# Patient Record
Sex: FEMALE | Race: WHITE | NOT HISPANIC OR LATINO | URBAN - NONMETROPOLITAN AREA
[De-identification: names, ages, dates, MRNs, and addresses within clinical notes are randomized per-mention and may not be internally consistent; named-entity substitution may affect disease eponyms.]

---

## 2018-02-05 ENCOUNTER — OFFICE VISIT (OUTPATIENT)
Dept: CARDIOLOGY | Facility: CLINIC | Age: 63
End: 2018-02-05

## 2018-02-05 VITALS
SYSTOLIC BLOOD PRESSURE: 112 MMHG | BODY MASS INDEX: 22.18 KG/M2 | WEIGHT: 138 LBS | HEIGHT: 66 IN | HEART RATE: 81 BPM | DIASTOLIC BLOOD PRESSURE: 76 MMHG

## 2018-02-05 DIAGNOSIS — M54.50 CHRONIC MIDLINE LOW BACK PAIN WITHOUT SCIATICA: ICD-10-CM

## 2018-02-05 DIAGNOSIS — R07.2 PRECORDIAL PAIN: Primary | ICD-10-CM

## 2018-02-05 DIAGNOSIS — G89.29 CHRONIC MIDLINE LOW BACK PAIN WITHOUT SCIATICA: ICD-10-CM

## 2018-02-05 DIAGNOSIS — R06.09 DYSPNEA ON EXERTION: ICD-10-CM

## 2018-02-05 DIAGNOSIS — R07.2 PRECORDIAL PAIN: ICD-10-CM

## 2018-02-05 DIAGNOSIS — R53.83 OTHER FATIGUE: ICD-10-CM

## 2018-02-05 PROCEDURE — 93000 ELECTROCARDIOGRAM COMPLETE: CPT | Performed by: INTERNAL MEDICINE

## 2018-02-05 PROCEDURE — 99204 OFFICE O/P NEW MOD 45 MIN: CPT | Performed by: INTERNAL MEDICINE

## 2018-02-05 RX ORDER — BUTALBITAL, ACETAMINOPHEN AND CAFFEINE 50; 325; 40 MG/1; MG/1; MG/1
TABLET ORAL
COMMUNITY
Start: 2018-01-04

## 2018-02-05 RX ORDER — LEVOTHYROXINE SODIUM 0.05 MG/1
50 TABLET ORAL DAILY
COMMUNITY

## 2018-02-05 RX ORDER — BUPROPION HYDROCHLORIDE 150 MG/1
TABLET, EXTENDED RELEASE ORAL
COMMUNITY
Start: 2018-02-03

## 2018-02-05 RX ORDER — ERGOCALCIFEROL 1.25 MG/1
CAPSULE ORAL
COMMUNITY
Start: 2018-01-03

## 2018-02-05 RX ORDER — TRAMADOL HYDROCHLORIDE 50 MG/1
TABLET ORAL
COMMUNITY
Start: 2018-01-04

## 2018-02-05 RX ORDER — FLUTICASONE PROPIONATE 50 MCG
2 SPRAY, SUSPENSION (ML) NASAL DAILY
COMMUNITY

## 2018-02-05 RX ORDER — MIRTAZAPINE 30 MG/1
30 TABLET, FILM COATED ORAL
COMMUNITY
Start: 2018-01-03

## 2018-02-05 RX ORDER — GABAPENTIN 600 MG/1
TABLET ORAL
COMMUNITY
Start: 2018-01-04

## 2018-02-05 RX ORDER — CLONAZEPAM 1 MG/1
TABLET ORAL
COMMUNITY
Start: 2018-01-04

## 2018-02-05 NOTE — PROGRESS NOTES
Jessica Westfall DO Monie Weaver  1955 02/05/2018    Patient Active Problem List   Diagnosis   • Dyspnea on exertion   • Other fatigue   • Low back pain       Dear Dr. Westfall:    Subjective     Chief complaint: Chest pains.    History of Present Illness: Ms. Weaver is a pleasant 62-year-old  female with no history of known heart disease or coronary artery disease, presents with complains of having some intermittent episodes of chest pains which are localized to the left side of her chest underneath her left breast which  are sharp in nature.  These seem to occur with no relation to exertion and mostly when she is lying down at night.  She does not have any symptoms related to exertion except that she is short of breath with the mild exertion at times.  She denies any history suggestive of PND, orthopnea or pedal edema.  She denies any substernal chest pain or discomfort on any radiation of pain into the left arm neck or back.  She has very few risk factors for coronary artery disease being nonhypertensive, nondiabetic.  She has history of smoking for about 3 years smoking about 3 cigarettes a day.  She has chronic back pains with previous history of back surgery.    Cardiac risk factors:Age and postmenopausal status.    Allergies   Allergen Reactions   • Other      Ragweed   :      Current Outpatient Prescriptions:   •  fluticasone (FLONASE) 50 MCG/ACT nasal spray, 2 sprays into each nostril Daily., Disp: , Rfl:   •  levothyroxine (SYNTHROID, LEVOTHROID) 50 MCG tablet, Take 50 mcg by mouth Daily., Disp: , Rfl:   •  buPROPion SR (WELLBUTRIN SR) 150 MG 12 hr tablet, , Disp: , Rfl:   •  butalbital-acetaminophen-caffeine (FIORICET, ESGIC) -40 MG per tablet, , Disp: , Rfl:   •  clonazePAM (KlonoPIN) 1 MG tablet, , Disp: , Rfl:   •  Estradiol (ESTRADERM TD), PLACE 5-6 DROPS ON INNER WRIST AT BEDTIME, Disp: , Rfl: 0  •  gabapentin (NEURONTIN) 600 MG tablet, , Disp: , Rfl:   •  mirtazapine (REMERON) 15 MG tablet,  ", Disp: , Rfl:   •  Progesterone Micronized (PROGESTERONE PO), TAKE 1 CAPSULE BY MOUTH EVERY DAY AT BEDTIME, Disp: , Rfl: 2  •  traMADol (ULTRAM) 50 MG tablet, , Disp: , Rfl:   •  vitamin D (ERGOCALCIFEROL) 54331 units capsule capsule, , Disp: , Rfl:     No past medical history on file.  No past surgical history on file.  Family History   Problem Relation Age of Onset   • Heart disease Father      Social History   Substance Use Topics   • Smoking status: Current Every Day Smoker     Types: Cigarettes   • Smokeless tobacco: Not on file      Comment: 3 cig. per day, pt states.   • Alcohol use No       Review of Systems   Constitution: Positive for malaise/fatigue.   Eyes: Positive for blurred vision.   Cardiovascular: Positive for chest pain and palpitations.   Respiratory: Negative.    Hematologic/Lymphatic: Bruises/bleeds easily.   Skin: Positive for dry skin.   Musculoskeletal: Positive for back pain, joint pain, muscle weakness and myalgias.   Gastrointestinal: Negative.    Genitourinary: Negative.    Neurological: Positive for headaches.   Psychiatric/Behavioral: Positive for depression. The patient has insomnia and is nervous/anxious.    Allergic/Immunologic: Negative.        Objective   Blood pressure 112/76, pulse 81, height 167.6 cm (66\"), weight 62.6 kg (138 lb).  Body mass index is 22.27 kg/(m^2).        Physical Exam   Constitutional: She is oriented to person, place, and time. She appears well-developed and well-nourished.   HENT:   Head: Normocephalic.   Eyes: Conjunctivae and EOM are normal.   Neck: Normal range of motion. Neck supple. No JVD present. No tracheal deviation present. No thyromegaly present.   Cardiovascular: Normal rate, regular rhythm, S1 normal and S2 normal.  Exam reveals no gallop, no S3, no S4 and no friction rub.    No murmur heard.  Pulmonary/Chest: Breath sounds normal. No respiratory distress. She has no wheezes. She has no rales.   Abdominal: Soft. Bowel sounds are normal. She " exhibits no mass. There is no tenderness.   Musculoskeletal: She exhibits no edema.   Neurological: She is alert and oriented to person, place, and time. No cranial nerve deficit.   Skin: Skin is warm and dry.   Psychiatric: She has a normal mood and affect.            ECG 12 Lead  Date/Time: 2/5/2018 4:45 PM  Performed by: VICTOR HUGO GONGORA  Authorized by: VICTOR HUGO GONGORA   Rhythm: sinus rhythm  ST Segments: ST segments normal  T Waves: T waves normal              Assessment/Plan   Diagnoses and all orders for this visit:    1. Precordial pain sounds somewhat atypical for angina pectoris.  2. Dyspnea on exertion which could be angina equivalent.  3. Chronic fatigue.  4. Chronic midline low back pain without sciatica.         Recommendations:     Evaluate  further with her next scan sestamibi study and echo Doppler study.    Return in about 4 weeks (around 3/5/2018).    As always, I appreciate very much the opportunity to participate in the cardiovascular care of your patients.      With Best Regards,    Victor Hugo Gongora MD, FACC

## 2018-04-04 ENCOUNTER — TELEPHONE (OUTPATIENT)
Dept: CARDIOLOGY | Facility: CLINIC | Age: 63
End: 2018-04-04

## 2018-04-04 NOTE — TELEPHONE ENCOUNTER
Patient is scheduled to have a stress and echo and she says she has a really high deductible and high percentage she will have to pay and wants to know if it will be medically ok for her to wait until July when her medicare kicks in to have the test. Patient says she is feeling ok right now. If she needs to have the test at this time she is willing to. If she can wait from a medical stand point until July she would like to wait. Can someone call her back asap? If patient does not answer at this number she ask that you leave a message.     Thanks!

## 2018-04-06 NOTE — TELEPHONE ENCOUNTER
If she is not having any more significant chest pains, she could wait until July otherwise we will have to do it sooner.

## 2018-04-13 ENCOUNTER — APPOINTMENT (OUTPATIENT)
Dept: CARDIOLOGY | Facility: HOSPITAL | Age: 63
End: 2018-04-13
Attending: INTERNAL MEDICINE

## 2018-04-13 ENCOUNTER — APPOINTMENT (OUTPATIENT)
Dept: NUCLEAR MEDICINE | Facility: HOSPITAL | Age: 63
End: 2018-04-13
Attending: INTERNAL MEDICINE

## 2018-04-16 NOTE — TELEPHONE ENCOUNTER
Called pt and advised her. She expressed understanding.   She stated that that her wire in her bar was what was causing her chest pain.

## 2018-09-28 ENCOUNTER — TELEPHONE (OUTPATIENT)
Dept: CARDIOLOGY | Facility: CLINIC | Age: 63
End: 2018-09-28

## 2018-10-01 NOTE — TELEPHONE ENCOUNTER
This was ordered over 6 months ago. I feel like she needs to come in before we can order it again.

## 2019-02-12 ENCOUNTER — OFFICE VISIT (OUTPATIENT)
Dept: CARDIOLOGY | Facility: CLINIC | Age: 64
End: 2019-02-12

## 2019-02-12 VITALS
HEART RATE: 110 BPM | WEIGHT: 129 LBS | BODY MASS INDEX: 20.82 KG/M2 | DIASTOLIC BLOOD PRESSURE: 76 MMHG | SYSTOLIC BLOOD PRESSURE: 107 MMHG

## 2019-02-12 DIAGNOSIS — R07.2 PRECORDIAL PAIN: Primary | ICD-10-CM

## 2019-02-12 DIAGNOSIS — R00.0 SINUS TACHYCARDIA: ICD-10-CM

## 2019-02-12 PROBLEM — R00.2 PALPITATIONS: Status: ACTIVE | Noted: 2018-02-06

## 2019-02-12 PROCEDURE — 93000 ELECTROCARDIOGRAM COMPLETE: CPT | Performed by: PHYSICIAN ASSISTANT

## 2019-02-12 PROCEDURE — 99214 OFFICE O/P EST MOD 30 MIN: CPT | Performed by: PHYSICIAN ASSISTANT

## 2019-02-12 RX ORDER — MECLIZINE HYDROCHLORIDE 25 MG/1
TABLET ORAL
COMMUNITY
Start: 2019-02-06

## 2019-02-12 RX ORDER — PROPRANOLOL HYDROCHLORIDE 10 MG/1
10 TABLET ORAL 2 TIMES DAILY
COMMUNITY

## 2019-02-12 RX ORDER — TIZANIDINE 4 MG/1
0.5 TABLET ORAL EVERY 8 HOURS
COMMUNITY
Start: 2018-12-07

## 2019-02-12 RX ORDER — TOPIRAMATE 50 MG/1
1 TABLET, FILM COATED ORAL EVERY 12 HOURS
COMMUNITY
Start: 2018-12-07

## 2019-02-12 RX ORDER — SUMATRIPTAN 100 MG/1
1 TABLET, FILM COATED ORAL
COMMUNITY
Start: 2018-12-07

## 2019-02-12 RX ORDER — VENLAFAXINE HYDROCHLORIDE 150 MG/1
CAPSULE, EXTENDED RELEASE ORAL
COMMUNITY
Start: 2019-02-05

## 2019-02-12 RX ORDER — SODIUM PHOSPHATE,MONO-DIBASIC 19G-7G/118
1 ENEMA (ML) RECTAL EVERY 8 HOURS
COMMUNITY
Start: 2018-12-07

## 2019-02-12 RX ORDER — MELATONIN 10 MG
0.5 CAPSULE ORAL
COMMUNITY
Start: 2018-12-07

## 2019-02-12 NOTE — PROGRESS NOTES
Jossy Chew APRN  Monie Weaver  1955 02/12/2019    Patient Active Problem List   Diagnosis   • Dyspnea on exertion   • Other fatigue   • Low back pain   • Precordial pain   • Palpitations       Dear Jossy Chew APRN:    Subjective     History of Present Illness:    Chief Complaint   Patient presents with   • Chest Pain     pt l/s 9/2018   • Med Management     list   • Palpitations     fam hx afib       Monie Weaver is a pleasant 63 y.o. female with a past medical history significant for history of precordial pain, comes in for routine cardiology follow up.     Patient admits to some chest pain that occurred once a year ago but denies any chest pain the last 12 months. She does admit to some shortness of breath walking up her drive way which does have an incline. She denies any orthopnea, pedal edema, PND. She also denies any palpitations, dizziness, or syncope.     She denies history of tobacco abuse, diabetes, or history of past heart attack.     Allergies   Allergen Reactions   • Cefdinir Diarrhea   • Cefuroxime Diarrhea   • Other      Ragweed   :      Current Outpatient Medications:   •  butalbital-acetaminophen-caffeine (FIORICET, ESGIC) -40 MG per tablet, , Disp: , Rfl:   •  clonazePAM (KlonoPIN) 1 MG tablet, , Disp: , Rfl:   •  Estradiol (ESTRADERM TD), PLACE 5-6 DROPS ON INNER WRIST AT BEDTIME, Disp: , Rfl: 0  •  levothyroxine (SYNTHROID, LEVOTHROID) 50 MCG tablet, Take 50 mcg by mouth Daily., Disp: , Rfl:   •  mirtazapine (REMERON SOL-TAB) 30 MG disintegrating tablet, 30 mg., Disp: , Rfl:   •  propranolol (INDERAL) 10 MG tablet, Take 10 mg by mouth 2 (Two) Times a Day., Disp: , Rfl:   •  SUMAtriptan (IMITREX) 100 MG tablet, Take one tablet at onset of headache. May repeat dose one time in 2 hours if headache not relieved., Disp: , Rfl:   •  tiZANidine (ZANAFLEX) 4 MG tablet, Take 0.5 tablets by mouth Every 8 (Eight) Hours., Disp: , Rfl:   •  topiramate (TOPAMAX) 50 MG tablet, Take 1 tablet  by mouth Every 12 (Twelve) Hours., Disp: , Rfl:   •  venlafaxine XR (EFFEXOR-XR) 150 MG 24 hr capsule, , Disp: , Rfl:   •  vitamin D (ERGOCALCIFEROL) 84052 units capsule capsule, , Disp: , Rfl:   •  buPROPion SR (WELLBUTRIN SR) 150 MG 12 hr tablet, , Disp: , Rfl:   •  fluticasone (FLONASE) 50 MCG/ACT nasal spray, 2 sprays into each nostril Daily., Disp: , Rfl:   •  gabapentin (NEURONTIN) 600 MG tablet, , Disp: , Rfl:   •  glucosamine-chondroitin 500-400 MG capsule capsule, Take 1 tablet by mouth Every 8 (Eight) Hours., Disp: , Rfl:   •  meclizine (ANTIVERT) 25 MG tablet, , Disp: , Rfl:   •  Melatonin 10 MG capsule, Take 0.5 tablets by mouth., Disp: , Rfl:   •  Progesterone Micronized (PROGESTERONE PO), TAKE 1 CAPSULE BY MOUTH EVERY DAY AT BEDTIME, Disp: , Rfl: 2  •  traMADol (ULTRAM) 50 MG tablet, , Disp: , Rfl:       The following portions of the patient's history were reviewed and updated as appropriate: allergies, current medications, past family history, past medical history, past social history, past surgical history and problem list.    Social History     Tobacco Use   • Smoking status: Current Every Day Smoker     Types: Cigarettes   • Tobacco comment: 3 cig. per day, pt states.   Substance Use Topics   • Alcohol use: No   • Drug use: No       Review of Systems   Constitution: Negative for weakness and malaise/fatigue.   Cardiovascular: Positive for chest pain, dyspnea on exertion and irregular heartbeat.   Respiratory: Positive for shortness of breath. Negative for cough.    Hematologic/Lymphatic: Negative for bleeding problem. Does not bruise/bleed easily.   Gastrointestinal: Negative for nausea and vomiting.       Objective   Vitals:    02/12/19 1429   BP: 107/76   BP Location: Left arm   Patient Position: Sitting   Cuff Size: Adult   Pulse: 110   Weight: 58.5 kg (129 lb)     Body mass index is 20.82 kg/m².    Physical Exam   Constitutional: She is oriented to person, place, and time. She appears  well-developed and well-nourished. No distress.   HENT:   Head: Normocephalic and atraumatic.   Cardiovascular: Normal rate, regular rhythm, normal heart sounds and intact distal pulses.   Pulmonary/Chest: Effort normal and breath sounds normal. No respiratory distress.   Musculoskeletal: She exhibits no edema.   Neurological: She is alert and oriented to person, place, and time.   Skin: She is not diaphoretic.     Lab Results   Component Value Date     08/31/2015    K 4.5 08/31/2015     08/31/2015    CO2 27.9 08/31/2015    BUN 20 08/31/2015    CREATININE 0.69 08/31/2015    GLUCOSE 105 (H) 08/31/2015    CALCIUM 9.6 08/31/2015    AST 28 08/31/2015    ALT 27 08/31/2015    ALKPHOS 118 (H) 08/31/2015    LABIL2 1.9 08/31/2015     No results found for: CKTOTAL  Lab Results   Component Value Date    WBC 4.7 08/31/2015    HGB 14.3 08/31/2015    HCT 44.3 08/31/2015     08/31/2015     No results found for: INR  No results found for: MG  Lab Results   Component Value Date    TSH 1.748 08/31/2015    CHLPL 209 (H) 08/31/2015    TRIG 62 08/31/2015    HDL 87 08/31/2015     (H) 08/31/2015      No results found for: BNP    During this visit the following were done:  Labs Reviewed [x]    Labs Ordered []    Radiology Reports Reviewed [x]    Radiology Ordered []    PCP Records Reviewed []    Referring Provider Records Reviewed []    ER Records Reviewed []    Hospital Records Reviewed []    History Obtained From Family []    Radiology Images Reviewed []    Other Reviewed []    Records Requested []         ECG 12 Lead  Date/Time: 2/12/2019 3:05 PM  Performed by: Ramya Salinas CMA  Authorized by: Yogi Ponce, SCOTT   Rhythm: sinus rhythm  Conduction: conduction normal  ST Segments: ST segments normal  T inversion: aVL, V1 and V2  T flattening: V3    Clinical impression: non-specific ECG          Assessment/Plan    Diagnosis Plan   1. Precordial pain  Adult Transthoracic Echo Complete W/ Cont if  Necessary Per Protocol    Stress Test With Myocardial Perfusion   2. Sinus tachycardia  TSH    CBC & Differential          Recommendations:  1. Since patient is reporting some shortness of breath with left atrial enlargement and has evidence of possible previous MI in the anterior wall, I'll order a stress test and transthoracic echocardiogram.  2. Since patient is tachycardic will also check a cbc and TSH.   3. Follow up in 4 weeks    Patient's Body mass index is 20.82 kg/m². BMI is within normal parameters. No follow-up required..       Return in about 4 weeks (around 3/12/2019).    As always, I appreciate very much the opportunity to participate in the cardiovascular care of your patients.      With Best Regards,    SCOTT Clark disclaimer:  Much of this encounter note is an electronic transcription/translation of spoken language to printed text. The electronic translation of spoken language may permit erroneous, or at times, nonsensical words or phrases to be inadvertently transcribed; Although I have reviewed the note for such errors, some may still exist.

## 2019-03-06 ENCOUNTER — LAB (OUTPATIENT)
Dept: LAB | Facility: HOSPITAL | Age: 64
End: 2019-03-06

## 2019-03-06 DIAGNOSIS — R00.0 SINUS TACHYCARDIA: ICD-10-CM

## 2019-03-06 LAB
BASOPHILS # BLD AUTO: 0.03 10*3/MM3 (ref 0–0.3)
BASOPHILS NFR BLD AUTO: 0.5 % (ref 0–2)
DEPRECATED RDW RBC AUTO: 46.7 FL (ref 37–54)
EOSINOPHIL # BLD AUTO: 0.19 10*3/MM3 (ref 0–0.7)
EOSINOPHIL NFR BLD AUTO: 3 % (ref 0–7)
ERYTHROCYTE [DISTWIDTH] IN BLOOD BY AUTOMATED COUNT: 13.2 % (ref 11.5–14.5)
HCT VFR BLD AUTO: 42.9 % (ref 37–47)
HGB BLD-MCNC: 14.5 G/DL (ref 12–16)
IMM GRANULOCYTES # BLD AUTO: 0.02 10*3/MM3 (ref 0–0.03)
IMM GRANULOCYTES NFR BLD AUTO: 0.3 % (ref 0–0.5)
LYMPHOCYTES # BLD AUTO: 1.57 10*3/MM3 (ref 1–3)
LYMPHOCYTES NFR BLD AUTO: 24.7 % (ref 16–46)
MCH RBC QN AUTO: 32.8 PG (ref 27–33)
MCHC RBC AUTO-ENTMCNC: 33.8 G/DL (ref 33–37)
MCV RBC AUTO: 97.1 FL (ref 80–94)
MONOCYTES # BLD AUTO: 0.72 10*3/MM3 (ref 0.1–0.9)
MONOCYTES NFR BLD AUTO: 11.3 % (ref 0–12)
NEUTROPHILS # BLD AUTO: 3.83 10*3/MM3 (ref 1.4–6.5)
NEUTROPHILS NFR BLD AUTO: 60.2 % (ref 40–75)
PLATELET # BLD AUTO: 269 10*3/MM3 (ref 130–400)
PMV BLD AUTO: 10.8 FL (ref 6–10)
RBC # BLD AUTO: 4.42 10*6/MM3 (ref 4.2–5.4)
TSH SERPL DL<=0.05 MIU/L-ACNC: 3.74 MIU/ML (ref 0.55–4.78)
WBC NRBC COR # BLD: 6.36 10*3/MM3 (ref 4.5–12.5)

## 2019-03-06 PROCEDURE — 84443 ASSAY THYROID STIM HORMONE: CPT

## 2019-03-06 PROCEDURE — 36415 COLL VENOUS BLD VENIPUNCTURE: CPT

## 2019-03-06 PROCEDURE — 85025 COMPLETE CBC W/AUTO DIFF WBC: CPT

## 2019-03-13 ENCOUNTER — HOSPITAL ENCOUNTER (OUTPATIENT)
Dept: NUCLEAR MEDICINE | Facility: HOSPITAL | Age: 64
Discharge: HOME | End: 2019-03-13

## 2019-03-13 ENCOUNTER — HOSPITAL ENCOUNTER (OUTPATIENT)
Dept: CARDIOLOGY | Facility: HOSPITAL | Age: 64
Discharge: HOME | End: 2019-03-13

## 2019-03-13 DIAGNOSIS — R07.2 PRECORDIAL PAIN: ICD-10-CM

## 2019-03-13 PROCEDURE — 78452 HT MUSCLE IMAGE SPECT MULT: CPT

## 2019-03-13 PROCEDURE — 93306 TTE W/DOPPLER COMPLETE: CPT

## 2019-03-13 PROCEDURE — 93017 CV STRESS TEST TRACING ONLY: CPT

## 2019-03-13 PROCEDURE — A9500 TC99M SESTAMIBI: HCPCS | Performed by: PHYSICIAN ASSISTANT

## 2019-03-13 PROCEDURE — 93306 TTE W/DOPPLER COMPLETE: CPT | Performed by: INTERNAL MEDICINE

## 2019-03-13 PROCEDURE — 0 TECHNETIUM SESTAMIBI: Performed by: PHYSICIAN ASSISTANT

## 2019-03-13 PROCEDURE — 93018 CV STRESS TEST I&R ONLY: CPT | Performed by: INTERNAL MEDICINE

## 2019-03-13 PROCEDURE — 25010000002 REGADENOSON 0.4 MG/5ML SOLUTION: Performed by: PHYSICIAN ASSISTANT

## 2019-03-13 PROCEDURE — 78452 HT MUSCLE IMAGE SPECT MULT: CPT | Performed by: INTERNAL MEDICINE

## 2019-03-13 RX ADMIN — TECHNETIUM TC 99M SESTAMIBI 1 DOSE: 1 INJECTION INTRAVENOUS at 08:55

## 2019-03-13 RX ADMIN — REGADENOSON 0.4 MG: 0.08 INJECTION, SOLUTION INTRAVENOUS at 10:50

## 2019-03-13 RX ADMIN — TECHNETIUM TC 99M SESTAMIBI 1 DOSE: 1 INJECTION INTRAVENOUS at 10:50

## 2019-03-15 ENCOUNTER — OFFICE VISIT (OUTPATIENT)
Dept: CARDIOLOGY | Facility: CLINIC | Age: 64
End: 2019-03-15

## 2019-03-15 VITALS
WEIGHT: 131 LBS | SYSTOLIC BLOOD PRESSURE: 141 MMHG | DIASTOLIC BLOOD PRESSURE: 89 MMHG | HEART RATE: 103 BPM | RESPIRATION RATE: 16 BRPM | BODY MASS INDEX: 21.14 KG/M2

## 2019-03-15 DIAGNOSIS — R00.2 PALPITATIONS: Primary | ICD-10-CM

## 2019-03-15 DIAGNOSIS — R06.09 DYSPNEA ON EXERTION: ICD-10-CM

## 2019-03-15 DIAGNOSIS — R07.2 PRECORDIAL PAIN: ICD-10-CM

## 2019-03-15 LAB
BH CV ECHO MEAS - % IVS THICK: 68.4 %
BH CV ECHO MEAS - % LVPW THICK: 56 %
BH CV ECHO MEAS - ACS: 1.7 CM
BH CV ECHO MEAS - AO ROOT AREA (BSA CORRECTED): 1.7
BH CV ECHO MEAS - AO ROOT AREA: 5.9 CM^2
BH CV ECHO MEAS - AO ROOT DIAM: 2.7 CM
BH CV ECHO MEAS - BSA(HAYCOCK): 1.6 M^2
BH CV ECHO MEAS - BSA: 1.7 M^2
BH CV ECHO MEAS - BZI_BMI: 20.8 KILOGRAMS/M^2
BH CV ECHO MEAS - BZI_METRIC_HEIGHT: 167.6 CM
BH CV ECHO MEAS - BZI_METRIC_WEIGHT: 58.5 KG
BH CV ECHO MEAS - EDV(CUBED): 56.5 ML
BH CV ECHO MEAS - EDV(TEICH): 63.4 ML
BH CV ECHO MEAS - EF(CUBED): 78 %
BH CV ECHO MEAS - EF(TEICH): 70.9 %
BH CV ECHO MEAS - ESV(CUBED): 12.4 ML
BH CV ECHO MEAS - ESV(TEICH): 18.4 ML
BH CV ECHO MEAS - FS: 39.7 %
BH CV ECHO MEAS - IVS/LVPW: 0.76
BH CV ECHO MEAS - IVSD: 0.63 CM
BH CV ECHO MEAS - IVSS: 1.1 CM
BH CV ECHO MEAS - LA DIMENSION: 3 CM
BH CV ECHO MEAS - LA/AO: 1.1
BH CV ECHO MEAS - LV MASS(C)D: 77 GRAMS
BH CV ECHO MEAS - LV MASS(C)DI: 46.4 GRAMS/M^2
BH CV ECHO MEAS - LV MASS(C)S: 74.7 GRAMS
BH CV ECHO MEAS - LV MASS(C)SI: 45 GRAMS/M^2
BH CV ECHO MEAS - LVIDD: 3.8 CM
BH CV ECHO MEAS - LVIDS: 2.3 CM
BH CV ECHO MEAS - LVPWD: 0.83 CM
BH CV ECHO MEAS - LVPWS: 1.3 CM
BH CV ECHO MEAS - MV A MAX VEL: 88.4 CM/SEC
BH CV ECHO MEAS - MV E MAX VEL: 84.4 CM/SEC
BH CV ECHO MEAS - MV E/A: 0.96
BH CV ECHO MEAS - PA ACC SLOPE: 986.9 CM/SEC^2
BH CV ECHO MEAS - PA ACC TIME: 0.09 SEC
BH CV ECHO MEAS - PA PR(ACCEL): 39.4 MMHG
BH CV ECHO MEAS - RVDD: 1.2 CM
BH CV ECHO MEAS - SI(CUBED): 26.6 ML/M^2
BH CV ECHO MEAS - SI(TEICH): 27.1 ML/M^2
BH CV ECHO MEAS - SV(CUBED): 44.1 ML
BH CV ECHO MEAS - SV(TEICH): 45 ML
BH CV NUCLEAR PRIOR STUDY: 3
BH CV STRESS BP STAGE 1: NORMAL
BH CV STRESS BP STAGE 2: NORMAL
BH CV STRESS COMMENTS STAGE 1: NORMAL
BH CV STRESS COMMENTS STAGE 2: NORMAL
BH CV STRESS DOSE REGADENOSON STAGE 1: 0.4
BH CV STRESS DURATION MIN STAGE 1: 0
BH CV STRESS DURATION MIN STAGE 2: 4
BH CV STRESS DURATION SEC STAGE 1: 10
BH CV STRESS DURATION SEC STAGE 2: 0
BH CV STRESS HR STAGE 1: 109
BH CV STRESS HR STAGE 2: 90
BH CV STRESS PROTOCOL 1: NORMAL
BH CV STRESS RECOVERY BP: NORMAL MMHG
BH CV STRESS RECOVERY HR: 90 BPM
BH CV STRESS STAGE 1: 1
BH CV STRESS STAGE 2: 2
MAXIMAL PREDICTED HEART RATE: 155 BPM
MAXIMAL PREDICTED HEART RATE: 155 BPM
PERCENT MAX PREDICTED HR: 70.32 %
STRESS BASELINE BP: NORMAL MMHG
STRESS BASELINE HR: 77 BPM
STRESS PERCENT HR: 83 %
STRESS POST PEAK BP: NORMAL MMHG
STRESS POST PEAK HR: 109 BPM
STRESS TARGET HR: 132 BPM
STRESS TARGET HR: 132 BPM

## 2019-03-15 PROCEDURE — 99214 OFFICE O/P EST MOD 30 MIN: CPT | Performed by: PHYSICIAN ASSISTANT

## 2019-03-15 NOTE — PROGRESS NOTES
Jossy Chew APRN  Monie Weaver  1955  03/15/2019    Patient Active Problem List   Diagnosis   • Dyspnea on exertion   • Other fatigue   • Low back pain   • Precordial pain   • Palpitations       Dear Jossy Chew APRN:    Subjective       History of Present Illness:    Chief compliant: Follow up on echo doppler and stress test    Monie Weaver is a pleasant 63 y.o. female with a past medical history significant for history of precordial pain and palpitations.  Patient comes in to discuss recent stress test and transthoracic echocardiogram.    Patient states that her symptoms have improved she still does have some changes in her chest she states last for just a couple seconds.  Her stress test showed no signs of ischemia and her echocardiogram showed normal pump function without significant valvular disease.  She does state that she has been under a lot of stress recently but is excited to see some family that is traveling down that she has not seen a long time.      Allergies   Allergen Reactions   • Cefdinir Diarrhea   • Cefuroxime Diarrhea   • Other      Ragweed   :      Current Outpatient Medications:   •  clonazePAM (KlonoPIN) 1 MG tablet, , Disp: , Rfl:   •  levothyroxine (SYNTHROID, LEVOTHROID) 50 MCG tablet, Take 50 mcg by mouth Daily., Disp: , Rfl:   •  venlafaxine XR (EFFEXOR-XR) 150 MG 24 hr capsule, , Disp: , Rfl:   •  vitamin D (ERGOCALCIFEROL) 79200 units capsule capsule, , Disp: , Rfl:   •  buPROPion SR (WELLBUTRIN SR) 150 MG 12 hr tablet, , Disp: , Rfl:   •  butalbital-acetaminophen-caffeine (FIORICET, ESGIC) -40 MG per tablet, , Disp: , Rfl:   •  Estradiol (ESTRADERM TD), PLACE 5-6 DROPS ON INNER WRIST AT BEDTIME, Disp: , Rfl: 0  •  fluticasone (FLONASE) 50 MCG/ACT nasal spray, 2 sprays into each nostril Daily., Disp: , Rfl:   •  gabapentin (NEURONTIN) 600 MG tablet, , Disp: , Rfl:   •  glucosamine-chondroitin 500-400 MG capsule capsule, Take 1 tablet by mouth Every 8 (Eight)  Hours., Disp: , Rfl:   •  meclizine (ANTIVERT) 25 MG tablet, , Disp: , Rfl:   •  Melatonin 10 MG capsule, Take 0.5 tablets by mouth., Disp: , Rfl:   •  mirtazapine (REMERON SOL-TAB) 30 MG disintegrating tablet, 30 mg., Disp: , Rfl:   •  Progesterone Micronized (PROGESTERONE PO), TAKE 1 CAPSULE BY MOUTH EVERY DAY AT BEDTIME, Disp: , Rfl: 2  •  propranolol (INDERAL) 10 MG tablet, Take 10 mg by mouth 2 (Two) Times a Day., Disp: , Rfl:   •  SUMAtriptan (IMITREX) 100 MG tablet, Take one tablet at onset of headache. May repeat dose one time in 2 hours if headache not relieved., Disp: , Rfl:   •  tiZANidine (ZANAFLEX) 4 MG tablet, Take 0.5 tablets by mouth Every 8 (Eight) Hours., Disp: , Rfl:   •  topiramate (TOPAMAX) 50 MG tablet, Take 1 tablet by mouth Every 12 (Twelve) Hours., Disp: , Rfl:   •  traMADol (ULTRAM) 50 MG tablet, , Disp: , Rfl:       The following portions of the patient's history were reviewed and updated as appropriate: allergies, current medications, past family history, past medical history, past social history, past surgical history and problem list.    Social History     Tobacco Use   • Smoking status: Current Every Day Smoker     Packs/day: 0.25     Types: Cigarettes   • Smokeless tobacco: Never Used   • Tobacco comment: 3 cig. per day, pt states.   Substance Use Topics   • Alcohol use: No   • Drug use: No       Review of Systems   Constitution: Negative for weakness and malaise/fatigue.   Cardiovascular: Negative for chest pain, dyspnea on exertion, irregular heartbeat, leg swelling, palpitations and syncope.   Respiratory: Positive for shortness of breath. Negative for cough.    Hematologic/Lymphatic: Negative for bleeding problem. Does not bruise/bleed easily.   Gastrointestinal: Negative for nausea and vomiting.   Neurological: Positive for dizziness.       Objective   Vitals:    03/15/19 1321   BP: 141/89   BP Location: Right arm   Pulse: 103   Resp: 16   Weight: 59.4 kg (131 lb)     Body mass  index is 21.14 kg/m².        Physical Exam   Constitutional: She is oriented to person, place, and time. She appears well-developed and well-nourished. No distress.   HENT:   Head: Normocephalic and atraumatic.   Cardiovascular: Normal rate, regular rhythm, normal heart sounds and intact distal pulses.   Pulmonary/Chest: Effort normal and breath sounds normal. No respiratory distress.   Musculoskeletal: She exhibits no edema.   Neurological: She is alert and oriented to person, place, and time.   Skin: She is not diaphoretic.       Lab Results   Component Value Date     08/31/2015    K 4.5 08/31/2015     08/31/2015    CO2 27.9 08/31/2015    BUN 20 08/31/2015    CREATININE 0.69 08/31/2015    GLUCOSE 105 (H) 08/31/2015    CALCIUM 9.6 08/31/2015    AST 28 08/31/2015    ALT 27 08/31/2015    ALKPHOS 118 (H) 08/31/2015    LABIL2 1.9 08/31/2015     No results found for: CKTOTAL  Lab Results   Component Value Date    WBC 6.36 03/06/2019    HGB 14.5 03/06/2019    HCT 42.9 03/06/2019     03/06/2019     No results found for: INR  No results found for: MG  Lab Results   Component Value Date    TSH 3.742 03/06/2019    CHLPL 209 (H) 08/31/2015    TRIG 62 08/31/2015    HDL 87 08/31/2015     (H) 08/31/2015      No results found for: BNP    During this visit the following were done:  Labs Reviewed [x]    Labs Ordered []    Radiology Reports Reviewed [x]    Radiology Ordered []    PCP Records Reviewed []    Referring Provider Records Reviewed []    ER Records Reviewed []    Hospital Records Reviewed []    History Obtained From Family []    Radiology Images Reviewed []    Other Reviewed []    Records Requested []       Procedures      Assessment/Plan    Diagnosis Plan   1. Palpitations  Holter Monitor - 48 Hour   2. Dyspnea on exertion     3. Precordial pain            Recommendations:  1. Since symptoms only last couple seconds and she is described more as a palpitation now we will go ahead and check a  48-hour Holter monitor.   2. Discussed results of echo doppler and stress test with her.     Patient's Body mass index is 21.14 kg/m². BMI is within normal parameters. No follow-up required..       Return in about 3 months (around 6/15/2019).    As always, I appreciate very much the opportunity to participate in the cardiovascular care of your patients.      With Best Regards,    SCOTT Clark disclaimer:  Much of this encounter note is an electronic transcription/translation of spoken language to printed text. The electronic translation of spoken language may permit erroneous, or at times, nonsensical words or phrases to be inadvertently transcribed; Although I have reviewed the note for such errors, some may still exist.

## 2020-04-07 ENCOUNTER — APPOINTMENT (OUTPATIENT)
Dept: BONE DENSITY | Facility: HOSPITAL | Age: 65
End: 2020-04-07

## 2022-03-31 ENCOUNTER — TRANSCRIBE ORDERS (OUTPATIENT)
Dept: ADMINISTRATIVE | Facility: HOSPITAL | Age: 67
End: 2022-03-31

## 2022-03-31 DIAGNOSIS — N18.31 STAGE 3A CHRONIC KIDNEY DISEASE: Primary | ICD-10-CM

## 2025-02-04 ENCOUNTER — TRANSCRIBE ORDERS (OUTPATIENT)
Dept: ADMINISTRATIVE | Facility: HOSPITAL | Age: 70
End: 2025-02-04
Payer: MEDICARE

## 2025-02-04 DIAGNOSIS — Z78.0 ASYMPTOMATIC POSTMENOPAUSAL STATUS: Primary | ICD-10-CM

## 2025-07-24 ENCOUNTER — OFFICE VISIT (OUTPATIENT)
Dept: CARDIOLOGY | Facility: CLINIC | Age: 70
End: 2025-07-24
Payer: MEDICARE

## 2025-07-24 VITALS
HEART RATE: 84 BPM | SYSTOLIC BLOOD PRESSURE: 108 MMHG | WEIGHT: 154.4 LBS | HEIGHT: 66 IN | BODY MASS INDEX: 24.81 KG/M2 | RESPIRATION RATE: 16 BRPM | DIASTOLIC BLOOD PRESSURE: 66 MMHG | OXYGEN SATURATION: 98 %

## 2025-07-24 DIAGNOSIS — F17.210 CIGARETTE SMOKER: ICD-10-CM

## 2025-07-24 DIAGNOSIS — E78.5 DYSLIPIDEMIA: ICD-10-CM

## 2025-07-24 DIAGNOSIS — R06.09 DYSPNEA ON EXERTION: ICD-10-CM

## 2025-07-24 DIAGNOSIS — E05.90 HYPERTHYROIDISM: ICD-10-CM

## 2025-07-24 DIAGNOSIS — I48.19 ATRIAL FIBRILLATION, PERSISTENT: Primary | ICD-10-CM

## 2025-07-24 PROCEDURE — 93000 ELECTROCARDIOGRAM COMPLETE: CPT | Performed by: SPECIALIST

## 2025-07-24 PROCEDURE — 99204 OFFICE O/P NEW MOD 45 MIN: CPT | Performed by: SPECIALIST

## 2025-07-24 RX ORDER — PROPRANOLOL HCL 20 MG
10 TABLET ORAL
COMMUNITY
Start: 2025-07-22 | End: 2025-07-24 | Stop reason: ALTCHOICE

## 2025-07-24 RX ORDER — ESCITALOPRAM OXALATE 10 MG/1
10 TABLET ORAL DAILY
COMMUNITY
Start: 2025-07-22

## 2025-07-24 RX ORDER — FLUCONAZOLE 150 MG/1
150 TABLET ORAL DAILY
COMMUNITY
Start: 2025-07-22

## 2025-07-24 RX ORDER — SUMATRIPTAN SUCCINATE 100 MG/1
100 TABLET ORAL
COMMUNITY
Start: 2025-07-22

## 2025-07-24 RX ORDER — ROSUVASTATIN CALCIUM 20 MG/1
20 TABLET, COATED ORAL DAILY
Qty: 90 TABLET | Refills: 3 | Status: SHIPPED | OUTPATIENT
Start: 2025-07-24

## 2025-07-24 RX ORDER — METOPROLOL TARTRATE 25 MG/1
25 TABLET, FILM COATED ORAL 2 TIMES DAILY
Qty: 180 TABLET | Refills: 3 | Status: SHIPPED | OUTPATIENT
Start: 2025-07-24

## 2025-07-24 RX ORDER — DIGOXIN 125 MCG
125 TABLET ORAL DAILY
Qty: 90 TABLET | Refills: 3 | Status: SHIPPED | OUTPATIENT
Start: 2025-07-24

## 2025-07-24 RX ORDER — HYDROXYZINE PAMOATE 25 MG/1
25 CAPSULE ORAL
COMMUNITY
Start: 2025-07-22

## 2025-07-24 RX ORDER — ERGOCALCIFEROL 1.25 MG/1
50000 CAPSULE, LIQUID FILLED ORAL WEEKLY
COMMUNITY
Start: 2025-07-22

## 2025-07-24 NOTE — PROGRESS NOTES
Subjective   Initial consultation, persistent atrial fibrillation  Monie Weaver is a 70 y.o. female who presents to day for Shortness of Breath (excessive), Fatigue (weakness), and Med Management (List provided).    CHIEF COMPLIANT  Chief Complaint   Patient presents with    Shortness of Breath     excessive    Fatigue     weakness    Med Management     List provided       Active Problems:  Problem List Items Addressed This Visit          Cardiac and Vasculature    Dyspnea on exertion    Relevant Orders    Adult Transthoracic Echo Complete W/ Cont if Necessary Per Protocol    Atrial fibrillation, persistent - Primary    Relevant Medications    metoprolol tartrate (LOPRESSOR) 25 MG tablet    digoxin (LANOXIN) 125 MCG tablet    apixaban (ELIQUIS) 5 MG tablet tablet    Other Relevant Orders    Adult Transthoracic Echo Complete W/ Cont if Necessary Per Protocol    Dyslipidemia    Relevant Medications    rosuvastatin (CRESTOR) 20 MG tablet       Endocrine and Metabolic    Hyperthyroidism    Relevant Medications    metoprolol tartrate (LOPRESSOR) 25 MG tablet       Tobacco    Cigarette smoker       HPI  HPI    History of Present Illness  The patient presents for evaluation of atrial fibrillation, hyperthyroidism, hypercholesterolemia, and health maintenance.    Approximately 1.5 weeks ago, she began experiencing sudden fatigue, which was so severe that she could not walk far without becoming breathless and needing to rest. A few days later, she noticed her toes turning white intermittently, resembling frostbite. She reports no heart palpitations or chest pain. Currently, she feels well and not overly tired. She also reports no swelling. Breathing difficulties persist only during long walks, not constantly.     There is no history of heart conditions. She has been prescribed several new medications, including propranolol, which she had been taking for the past 6 years. She ran out of propranolol a few weeks ago and has not  had a prescription since. She was taking 2 tablets twice daily, but it did not seem to help her calm down. She was informed that her heart rhythm was normal at [facility]. She lives alone and has no support system. She has been dealing with anxiety for the past few years and was told by a neurologist in [place] that it is genetic. She does not know how to check her pulse but has a neighbor who can assist her. She reports no bleeding issues. She has been taking an over-the-counter dual pain reliever containing ibuprofen and Tylenol for the past few days. She experienced severe right ankle pain yesterday, which has since resolved, but woke up with left hip pain this morning. She had pneumonia many years ago and was hospitalized due to an irregular heartbeat, which normalized overnight.    She has not been taking her thyroid medication for the past few weeks. She was prescribed levothyroxine for her thyroid condition.    She has always had low blood pressure, but it was recorded as 138 systolic during a recent doctor's visit. She does not have diabetes or high cholesterol. She had blood work done at [facility] and Jossy Dinh's office, but the results are not yet available.    She has not been screened for sleep apnea but does not believe she has it. She has always had difficulty sleeping and has requested medication to help her sleep. She does not snore at night. She smokes 3 cigarettes a day when walking her dog and has been doing so for the past 6 or 7 years. She is considering medication to help her quit smoking.    She was diagnosed with a urinary tract infection a few days ago and was given medication for it.    SOCIAL HISTORY  Occupations: Retired  Exercise: Walks the dog  Tobacco: The patient smokes 3 cigarettes a day and has been smoking for about 6 or 7 years.    FAMILY HISTORY  - Father: Atrial fibrillation, passed away at age 90 after hospitalization with sepsis and pneumonia.  - Brother: Fatal heart  attack, passed away at age 62.  - Mother: Stroke, passed away.       PRIOR MEDS  Current Outpatient Medications on File Prior to Visit   Medication Sig Dispense Refill    escitalopram (LEXAPRO) 10 MG tablet Take 1 tablet by mouth Daily.      fluconazole (DIFLUCAN) 150 MG tablet Take 1 tablet by mouth Daily.      hydrOXYzine pamoate (VISTARIL) 25 MG capsule Take 1 capsule by mouth.      levothyroxine (SYNTHROID, LEVOTHROID) 50 MCG tablet Take 1 tablet by mouth Daily.      Melatonin 10 MG capsule Take 0.5 tablets by mouth.      SUMAtriptan (IMITREX) 100 MG tablet Take 1 tablet by mouth.      vitamin D (ERGOCALCIFEROL) 1.25 MG (21414 UT) capsule capsule Take 1 capsule by mouth 1 (One) Time Per Week.      buPROPion SR (WELLBUTRIN SR) 150 MG 12 hr tablet       butalbital-acetaminophen-caffeine (FIORICET, ESGIC) -40 MG per tablet       clonazePAM (KlonoPIN) 1 MG tablet       fluticasone (FLONASE) 50 MCG/ACT nasal spray 2 sprays into each nostril Daily.      gabapentin (NEURONTIN) 600 MG tablet       glucosamine-chondroitin 500-400 MG capsule capsule Take 1 tablet by mouth Every 8 (Eight) Hours.      meclizine (ANTIVERT) 25 MG tablet       mirtazapine (REMERON SOL-TAB) 30 MG disintegrating tablet 30 mg.      Progesterone Micronized (PROGESTERONE PO) TAKE 1 CAPSULE BY MOUTH EVERY DAY AT BEDTIME  2    SUMAtriptan (IMITREX) 100 MG tablet Take 1 tablet by mouth.      tiZANidine (ZANAFLEX) 4 MG tablet Take 0.5 tablets by mouth Every 8 (Eight) Hours.      topiramate (TOPAMAX) 50 MG tablet Take 1 tablet by mouth Every 12 (Twelve) Hours.      traMADol (ULTRAM) 50 MG tablet       venlafaxine XR (EFFEXOR-XR) 150 MG 24 hr capsule       vitamin D (ERGOCALCIFEROL) 42494 units capsule capsule       [DISCONTINUED] Estradiol (ESTRADERM TD) PLACE 5-6 DROPS ON INNER WRIST AT BEDTIME (Patient not taking: Reported on 7/24/2025)  0    [DISCONTINUED] propranolol (INDERAL) 10 MG tablet Take 1 tablet by mouth 2 (Two) Times a Day.       "[DISCONTINUED] propranolol (INDERAL) 20 MG tablet Take 0.5 tablets by mouth.       No current facility-administered medications on file prior to visit.       ALLERGIES  Cefdinir, Cefuroxime, and Other    HISTORY  No past medical history on file.    Social History     Socioeconomic History    Marital status:    Tobacco Use    Smoking status: Every Day     Current packs/day: 0.25     Types: Cigarettes    Smokeless tobacco: Never    Tobacco comments:     3 cig. per day, pt states.   Substance and Sexual Activity    Alcohol use: No    Drug use: No       Family History   Problem Relation Age of Onset    Atrial fibrillation Father     Heart failure Father        Review of Systems   Respiratory:  Positive for shortness of breath. Negative for apnea, cough, choking, chest tightness, wheezing and stridor.    Cardiovascular:  Negative for chest pain, palpitations and leg swelling.       Objective     VITALS: /66 (BP Location: Left arm, Cuff Size: Adult)   Pulse 84   Resp 16   Ht 167.6 cm (66\")   Wt 70 kg (154 lb 6.4 oz)   SpO2 98%   BMI 24.92 kg/m²     LABS:   Lab Results (most recent)       None            IMAGING:   No Images in the past 120 days found..    EXAM:  Physical Exam  Vitals reviewed.   Constitutional:       Appearance: She is well-developed.   HENT:      Head: Normocephalic and atraumatic.   Eyes:      Pupils: Pupils are equal, round, and reactive to light.   Neck:      Thyroid: No thyromegaly.      Vascular: No JVD.   Cardiovascular:      Rate and Rhythm: Normal rate. Rhythm irregular.      Heart sounds: Normal heart sounds. No murmur heard.     No friction rub. No gallop.      Comments: Irregluraly irregular  Pulmonary:      Effort: Pulmonary effort is normal. No respiratory distress.      Breath sounds: Normal breath sounds. No stridor. No wheezing or rales.   Chest:      Chest wall: No tenderness.   Musculoskeletal:         General: No tenderness or deformity.      Cervical back: Neck " supple.   Skin:     General: Skin is warm and dry.   Neurological:      Mental Status: She is alert and oriented to person, place, and time.      Cranial Nerves: No cranial nerve deficit.      Coordination: Coordination normal.       Physical Exam  Blood Pressure: 108/66  Heart: Atrial fibrillation with rapid ventricular response, heart rate is 124 bpm.  Lungs: Clear to auscultation bilaterally.       Procedure     ECG 12 Lead    Date/Time: 2025 4:19 PM  Performed by: Mouna Johnson MD    Authorized by: Mouna Johnson MD  Comparison: compared with previous ECG   Comments: EKG: Atrial fibrillation with RVR, heart rate is 124 bpm, otherwise unremarkable EKG compared to the EKG on 2019 at the time patient was in sinus rhythm today as patient is in atrial fibrillation            Results  Labs   - Thyroid function test: 2025, Thyroid is overactive   - Lipid Panel: 2025, Bad cholesterol is 161 mg/dL, triglycerides are 179 mg/dL    Diagnostic Testing   - [id number], Atrial fibrillation with RVR, heart rate is 124 bpm, otherwise unremarkable compared to EKG on 2019       Assessment & Plan     Diagnoses and all orders for this visit:    1. Atrial fibrillation, persistent (Primary)  -     metoprolol tartrate (LOPRESSOR) 25 MG tablet; Take 1 tablet by mouth 2 (Two) Times a Day.  Dispense: 180 tablet; Refill: 3  -     digoxin (LANOXIN) 125 MCG tablet; Take 1 tablet by mouth Daily.  Dispense: 90 tablet; Refill: 3  -     apixaban (ELIQUIS) 5 MG tablet tablet; Take 1 tablet by mouth 2 (Two) Times a Day.  Dispense: 180 tablet; Refill: 1  -     Adult Transthoracic Echo Complete W/ Cont if Necessary Per Protocol; Future    2. Dyspnea on exertion  -     Adult Transthoracic Echo Complete W/ Cont if Necessary Per Protocol; Future    3. Cigarette smoker    4. Dyslipidemia  -     rosuvastatin (CRESTOR) 20 MG tablet; Take 1 tablet by mouth Daily.  Dispense: 90 tablet; Refill: 3    5. Hyperthyroidism    Other  orders  -     ECG 12 Lead      Assessment & Plan  1.  Persistent atrial fibrillation with rapid ventricular response:  -Recommend admission to the hospital, however patient refused she is aware of the risk of bleeding and atrial fibrillation with rapid ventricular sponsor.  - Metoprolol 25 mg twice daily and digoxin 0.25 mg/day prescribed for heart rate control.  - Eliquis 5 mg p.o. twice daily for thromboembolic prophylaxis prescribed.  - Echocardiogram ordered to evaluate heart function.  - Advised to seek immediate medical attention if experiencing difficulty breathing, heart rate >100 bpm, or a drop in blood pressure.  - Discussed the potential risks associated with atrial fibrillation, including stroke.  - Hospital admission for further evaluation and treatment was suggested but declined due to personal circumstances.    2. Hyperthyroidism:  - Advised to consult with the doctor at [facility] regarding thyroid medication as it may need adjustment.  - Instructed not to take thyroid medication until further consultation.  - Discussed the likelihood of hyperthyroidism contributing to atrial fibrillation.    3. Hypercholesterolemia:  - Informed about significantly elevated cholesterol levels: LDL at 161 mg/dL and triglycerides at 179 mg/dL.  - Discussed the importance of managing cholesterol levels and potential risks associated with high cholesterol.  - Prescription for cholesterol-lowering medication will be considered based on further evaluation.    4. Health maintenance:  - Advised to quit smoking as it is damaging the heart.    Follow-up: A follow-up visit is scheduled for next week.       Return in about 1 week (around 7/31/2025).      Advance Care Planning   ACP discussion was declined by the patient. Patient does not have an advance directive, declines further assistance.             MEDS ORDERED DURING VISIT:  New Medications Ordered This Visit   Medications    metoprolol tartrate (LOPRESSOR) 25 MG tablet      Sig: Take 1 tablet by mouth 2 (Two) Times a Day.     Dispense:  180 tablet     Refill:  3    digoxin (LANOXIN) 125 MCG tablet     Sig: Take 1 tablet by mouth Daily.     Dispense:  90 tablet     Refill:  3    apixaban (ELIQUIS) 5 MG tablet tablet     Sig: Take 1 tablet by mouth 2 (Two) Times a Day.     Dispense:  180 tablet     Refill:  1    rosuvastatin (CRESTOR) 20 MG tablet     Sig: Take 1 tablet by mouth Daily.     Dispense:  90 tablet     Refill:  3         As always, David Alexandra, PA-C  I appreciate very much the opportunity to participate in the cardiovascular care of your patients. Please do not hesitate to call me with any questions with regards to Monie Weaver evaluation and management.     Patient or patient representative verbalized consent for the use of Ambient Listening during the visit with  Mouna Johnson MD for chart documentation. 7/24/2025  16:19 EDT       This document has been electronically signed by Mouna Johnson MD  July 24, 2025 16:30 EDT    This note is dictated utilizing voice recognition software.

## 2025-08-05 ENCOUNTER — TELEPHONE (OUTPATIENT)
Dept: CARDIOLOGY | Facility: CLINIC | Age: 70
End: 2025-08-05
Payer: MEDICARE

## 2025-08-06 ENCOUNTER — TELEPHONE (OUTPATIENT)
Dept: CARDIOLOGY | Facility: CLINIC | Age: 70
End: 2025-08-06
Payer: MEDICARE

## 2025-08-11 ENCOUNTER — HOSPITAL ENCOUNTER (OUTPATIENT)
Dept: CARDIOLOGY | Facility: HOSPITAL | Age: 70
Discharge: HOME | End: 2025-08-11
Admitting: SPECIALIST
Payer: MEDICARE

## 2025-08-11 DIAGNOSIS — I48.19 ATRIAL FIBRILLATION, PERSISTENT: ICD-10-CM

## 2025-08-11 DIAGNOSIS — R06.09 DYSPNEA ON EXERTION: ICD-10-CM

## 2025-08-11 LAB
AORTIC DIMENSIONLESS INDEX: 0.64 (DI)
AV MEAN PRESS GRAD SYS DOP V1V2: 2 MMHG
AV VMAX SYS DOP: 90.9 CM/SEC
BH CV ECHO MEAS - ACS: 1.4 CM
BH CV ECHO MEAS - AO MAX PG: 3.3 MMHG
BH CV ECHO MEAS - AO ROOT DIAM: 2.7 CM
BH CV ECHO MEAS - AO V2 VTI: 13.1 CM
BH CV ECHO MEAS - AVA(I,D): 1.81 CM2
BH CV ECHO MEAS - EDV(CUBED): 42.9 ML
BH CV ECHO MEAS - EDV(MOD-SP2): 27.5 ML
BH CV ECHO MEAS - EDV(MOD-SP4): 29.7 ML
BH CV ECHO MEAS - EF(MOD-SP2): 54.2 %
BH CV ECHO MEAS - EF(MOD-SP4): 69.6 %
BH CV ECHO MEAS - ESV(CUBED): 15.6 ML
BH CV ECHO MEAS - ESV(MOD-SP2): 12.6 ML
BH CV ECHO MEAS - ESV(MOD-SP4): 9 ML
BH CV ECHO MEAS - FS: 28.6 %
BH CV ECHO MEAS - IVS/LVPW: 0.8 CM
BH CV ECHO MEAS - IVSD: 0.8 CM
BH CV ECHO MEAS - LA DIMENSION: 3.7 CM
BH CV ECHO MEAS - LAT PEAK E' VEL: 11.3 CM/SEC
BH CV ECHO MEAS - LV DIASTOLIC VOL/BSA (35-75): 16.6 CM2
BH CV ECHO MEAS - LV MASS(C)D: 88.8 GRAMS
BH CV ECHO MEAS - LV MAX PG: 1.28 MMHG
BH CV ECHO MEAS - LV MEAN PG: 1 MMHG
BH CV ECHO MEAS - LV SYSTOLIC VOL/BSA (12-30): 5 CM2
BH CV ECHO MEAS - LV V1 MAX: 56.6 CM/SEC
BH CV ECHO MEAS - LV V1 VTI: 8.4 CM
BH CV ECHO MEAS - LVIDD: 3.5 CM
BH CV ECHO MEAS - LVIDS: 2.5 CM
BH CV ECHO MEAS - LVOT AREA: 2.8 CM2
BH CV ECHO MEAS - LVOT DIAM: 1.9 CM
BH CV ECHO MEAS - LVPWD: 1 CM
BH CV ECHO MEAS - MED PEAK E' VEL: 9.1 CM/SEC
BH CV ECHO MEAS - MV A MAX VEL: 29.1 CM/SEC
BH CV ECHO MEAS - MV DEC SLOPE: 346 CM/SEC2
BH CV ECHO MEAS - MV E MAX VEL: 77.6 CM/SEC
BH CV ECHO MEAS - MV E/A: 2.7
BH CV ECHO MEAS - MV P1/2T: 65.7 MSEC
BH CV ECHO MEAS - MVA(P1/2T): 3.3 CM2
BH CV ECHO MEAS - PA ACC TIME: 0.07 SEC
BH CV ECHO MEAS - PA V2 MAX: 126 CM/SEC
BH CV ECHO MEAS - RAP SYSTOLE: 10 MMHG
BH CV ECHO MEAS - RVDD: 2.2 CM
BH CV ECHO MEAS - RVSP: 27.6 MMHG
BH CV ECHO MEAS - SV(LVOT): 23.7 ML
BH CV ECHO MEAS - SV(MOD-SP2): 14.9 ML
BH CV ECHO MEAS - SV(MOD-SP4): 20.7 ML
BH CV ECHO MEAS - SVI(LVOT): 13.2 ML/M2
BH CV ECHO MEAS - SVI(MOD-SP2): 8.3 ML/M2
BH CV ECHO MEAS - SVI(MOD-SP4): 11.5 ML/M2
BH CV ECHO MEAS - TAPSE (>1.6): 1.5 CM
BH CV ECHO MEAS - TR MAX PG: 17.6 MMHG
BH CV ECHO MEAS - TR MAX VEL: 210 CM/SEC
BH CV ECHO MEASUREMENTS AVERAGE E/E' RATIO: 7.61
LV EF BIPLANE MOD: 63.5 %

## 2025-08-11 PROCEDURE — 93306 TTE W/DOPPLER COMPLETE: CPT

## 2025-08-11 PROCEDURE — 93306 TTE W/DOPPLER COMPLETE: CPT | Performed by: SPECIALIST

## 2025-08-12 ENCOUNTER — TELEPHONE (OUTPATIENT)
Dept: CARDIOLOGY | Facility: CLINIC | Age: 70
End: 2025-08-12
Payer: MEDICARE

## 2025-08-19 ENCOUNTER — OFFICE VISIT (OUTPATIENT)
Dept: CARDIOLOGY | Facility: CLINIC | Age: 70
End: 2025-08-19
Payer: MEDICARE

## 2025-08-19 VITALS
HEART RATE: 77 BPM | OXYGEN SATURATION: 97 % | BODY MASS INDEX: 24.88 KG/M2 | WEIGHT: 154.8 LBS | SYSTOLIC BLOOD PRESSURE: 114 MMHG | HEIGHT: 66 IN | DIASTOLIC BLOOD PRESSURE: 72 MMHG

## 2025-08-19 DIAGNOSIS — R06.02 SHORTNESS OF BREATH: ICD-10-CM

## 2025-08-19 DIAGNOSIS — I20.89 ANGINAL EQUIVALENT: ICD-10-CM

## 2025-08-19 DIAGNOSIS — E05.90 HYPERTHYROIDISM: ICD-10-CM

## 2025-08-19 DIAGNOSIS — I48.19 ATRIAL FIBRILLATION, PERSISTENT: ICD-10-CM

## 2025-08-19 DIAGNOSIS — E78.5 DYSLIPIDEMIA: Primary | ICD-10-CM

## 2025-08-19 PROCEDURE — 1159F MED LIST DOCD IN RCRD: CPT | Performed by: NURSE PRACTITIONER

## 2025-08-19 PROCEDURE — 1160F RVW MEDS BY RX/DR IN RCRD: CPT | Performed by: NURSE PRACTITIONER

## 2025-08-26 ENCOUNTER — ANESTHESIA (OUTPATIENT)
Dept: CARDIOLOGY | Facility: HOSPITAL | Age: 70
End: 2025-08-26
Payer: MEDICARE

## 2025-08-26 ENCOUNTER — ANESTHESIA EVENT (OUTPATIENT)
Dept: CARDIOLOGY | Facility: HOSPITAL | Age: 70
End: 2025-08-26
Payer: MEDICARE

## 2025-08-26 ENCOUNTER — HOSPITAL ENCOUNTER (OUTPATIENT)
Dept: CARDIOLOGY | Facility: HOSPITAL | Age: 70
Discharge: HOME | End: 2025-08-26
Payer: MEDICARE

## 2025-08-26 VITALS
RESPIRATION RATE: 16 BRPM | SYSTOLIC BLOOD PRESSURE: 115 MMHG | OXYGEN SATURATION: 99 % | HEART RATE: 58 BPM | BODY MASS INDEX: 24.75 KG/M2 | DIASTOLIC BLOOD PRESSURE: 71 MMHG | TEMPERATURE: 97.4 F | HEIGHT: 66 IN | WEIGHT: 154 LBS

## 2025-08-26 DIAGNOSIS — I48.19 ATRIAL FIBRILLATION, PERSISTENT: ICD-10-CM

## 2025-08-26 LAB
QT INTERVAL: 428 MS
QTC INTERVAL: 458 MS

## 2025-08-26 PROCEDURE — 93005 ELECTROCARDIOGRAM TRACING: CPT | Performed by: INTERNAL MEDICINE

## 2025-08-26 PROCEDURE — 92960 CARDIOVERSION ELECTRIC EXT: CPT

## 2025-08-26 PROCEDURE — 25010000002 PROPOFOL 10 MG/ML EMULSION: Performed by: NURSE ANESTHETIST, CERTIFIED REGISTERED

## 2025-08-26 RX ORDER — PROPOFOL 10 MG/ML
VIAL (ML) INTRAVENOUS AS NEEDED
Status: DISCONTINUED | OUTPATIENT
Start: 2025-08-26 | End: 2025-08-26 | Stop reason: SURG

## 2025-08-26 RX ADMIN — PROPOFOL 70 MG: 10 INJECTION, EMULSION INTRAVENOUS at 10:41
